# Patient Record
Sex: FEMALE | Race: WHITE
[De-identification: names, ages, dates, MRNs, and addresses within clinical notes are randomized per-mention and may not be internally consistent; named-entity substitution may affect disease eponyms.]

---

## 2018-02-14 ENCOUNTER — HOSPITAL ENCOUNTER (EMERGENCY)
Age: 38
LOS: 1 days | Discharge: HOME | End: 2018-02-15
Payer: MEDICAID

## 2018-02-14 VITALS
DIASTOLIC BLOOD PRESSURE: 80 MMHG | HEART RATE: 110 BPM | TEMPERATURE: 97.88 F | RESPIRATION RATE: 17 BRPM | OXYGEN SATURATION: 97 % | SYSTOLIC BLOOD PRESSURE: 129 MMHG

## 2018-02-14 VITALS — BODY MASS INDEX: 30.4 KG/M2

## 2018-02-14 DIAGNOSIS — Z86.14: ICD-10-CM

## 2018-02-14 DIAGNOSIS — J40: ICD-10-CM

## 2018-02-14 DIAGNOSIS — L02.01: Primary | ICD-10-CM

## 2018-02-14 DIAGNOSIS — F17.200: ICD-10-CM

## 2018-02-14 PROCEDURE — 71046 X-RAY EXAM CHEST 2 VIEWS: CPT

## 2018-02-14 PROCEDURE — 99282 EMERGENCY DEPT VISIT SF MDM: CPT

## 2018-02-15 VITALS
HEART RATE: 106 BPM | RESPIRATION RATE: 18 BRPM | DIASTOLIC BLOOD PRESSURE: 91 MMHG | SYSTOLIC BLOOD PRESSURE: 140 MMHG | OXYGEN SATURATION: 99 %

## 2018-02-15 VITALS — OXYGEN SATURATION: 100 %

## 2018-04-12 ENCOUNTER — HOSPITAL ENCOUNTER (OUTPATIENT)
Dept: HOSPITAL 100 - ED | Age: 38
Discharge: HOME | End: 2018-04-12
Payer: SELF-PAY

## 2018-04-12 ENCOUNTER — HOSPITAL ENCOUNTER (EMERGENCY)
Age: 38
Discharge: HOME | End: 2018-04-12
Payer: MEDICAID

## 2018-04-12 VITALS
SYSTOLIC BLOOD PRESSURE: 132 MMHG | RESPIRATION RATE: 16 BRPM | HEART RATE: 76 BPM | DIASTOLIC BLOOD PRESSURE: 107 MMHG | OXYGEN SATURATION: 95 % | TEMPERATURE: 97.88 F

## 2018-04-12 VITALS — BODY MASS INDEX: 29.5 KG/M2

## 2018-04-12 DIAGNOSIS — L90.5: ICD-10-CM

## 2018-04-12 DIAGNOSIS — M25.511: Primary | ICD-10-CM

## 2018-04-12 DIAGNOSIS — Z04.41: Primary | ICD-10-CM

## 2018-04-12 DIAGNOSIS — M25.522: ICD-10-CM

## 2018-04-12 PROCEDURE — 73080 X-RAY EXAM OF ELBOW: CPT

## 2018-04-12 PROCEDURE — 73060 X-RAY EXAM OF HUMERUS: CPT

## 2018-04-12 PROCEDURE — 99282 EMERGENCY DEPT VISIT SF MDM: CPT

## 2018-04-17 ENCOUNTER — HOSPITAL ENCOUNTER (OUTPATIENT)
Age: 38
End: 2018-04-17
Payer: MEDICAID

## 2018-04-17 DIAGNOSIS — Z11.3: Primary | ICD-10-CM

## 2018-04-17 LAB — SAMPLE ADEQUACY CONTROL: (no result)

## 2018-04-17 PROCEDURE — 86803 HEPATITIS C AB TEST: CPT

## 2018-04-17 PROCEDURE — 87340 HEPATITIS B SURFACE AG IA: CPT

## 2018-04-17 PROCEDURE — 86703 HIV-1/HIV-2 1 RESULT ANTBDY: CPT

## 2018-04-17 PROCEDURE — 87591 N.GONORRHOEAE DNA AMP PROB: CPT

## 2018-04-17 PROCEDURE — 87491 CHLMYD TRACH DNA AMP PROBE: CPT

## 2018-04-17 PROCEDURE — 86592 SYPHILIS TEST NON-TREP QUAL: CPT

## 2018-04-17 PROCEDURE — 36415 COLL VENOUS BLD VENIPUNCTURE: CPT

## 2018-04-19 LAB — HEP C ANTIBODIES: <0.1 S/CO RATIO (ref 0–0.9)

## 2018-04-20 LAB — RAPID PLASMIN REAGIN (RPR): NONREACTIVE

## 2018-04-28 ENCOUNTER — HOSPITAL ENCOUNTER (EMERGENCY)
Age: 38
Discharge: HOME | End: 2018-04-28
Payer: MEDICAID

## 2018-04-28 VITALS
DIASTOLIC BLOOD PRESSURE: 97 MMHG | OXYGEN SATURATION: 99 % | HEART RATE: 70 BPM | RESPIRATION RATE: 16 BRPM | SYSTOLIC BLOOD PRESSURE: 132 MMHG

## 2018-04-28 VITALS
SYSTOLIC BLOOD PRESSURE: 138 MMHG | TEMPERATURE: 95.54 F | OXYGEN SATURATION: 99 % | DIASTOLIC BLOOD PRESSURE: 91 MMHG | RESPIRATION RATE: 16 BRPM | HEART RATE: 102 BPM

## 2018-04-28 VITALS — BODY MASS INDEX: 30.7 KG/M2

## 2018-04-28 DIAGNOSIS — E66.9: ICD-10-CM

## 2018-04-28 DIAGNOSIS — F17.210: ICD-10-CM

## 2018-04-28 DIAGNOSIS — F15.93: ICD-10-CM

## 2018-04-28 DIAGNOSIS — F32.9: ICD-10-CM

## 2018-04-28 DIAGNOSIS — F19.11: ICD-10-CM

## 2018-04-28 DIAGNOSIS — Z79.899: ICD-10-CM

## 2018-04-28 DIAGNOSIS — F41.9: Primary | ICD-10-CM

## 2018-04-28 PROCEDURE — 99282 EMERGENCY DEPT VISIT SF MDM: CPT

## 2018-04-29 ENCOUNTER — HOSPITAL ENCOUNTER (EMERGENCY)
Age: 38
Discharge: HOME | End: 2018-04-29
Payer: MEDICAID

## 2018-04-29 VITALS
HEART RATE: 110 BPM | DIASTOLIC BLOOD PRESSURE: 91 MMHG | TEMPERATURE: 98.1 F | SYSTOLIC BLOOD PRESSURE: 145 MMHG | RESPIRATION RATE: 15 BRPM

## 2018-04-29 VITALS — OXYGEN SATURATION: 98 % | RESPIRATION RATE: 16 BRPM | HEART RATE: 86 BPM

## 2018-04-29 VITALS — BODY MASS INDEX: 30.2 KG/M2

## 2018-04-29 DIAGNOSIS — F41.9: ICD-10-CM

## 2018-04-29 DIAGNOSIS — F32.9: ICD-10-CM

## 2018-04-29 DIAGNOSIS — Z86.14: ICD-10-CM

## 2018-04-29 DIAGNOSIS — F15.90: Primary | ICD-10-CM

## 2018-04-29 DIAGNOSIS — Z79.899: ICD-10-CM

## 2018-04-29 PROCEDURE — 99282 EMERGENCY DEPT VISIT SF MDM: CPT

## 2018-05-01 ENCOUNTER — HOSPITAL ENCOUNTER (OUTPATIENT)
Dept: HOSPITAL 100 - EDREF | Age: 38
LOS: 1 days | Discharge: HOME | End: 2018-05-02
Payer: SELF-PAY

## 2018-05-01 DIAGNOSIS — Z04.41: Primary | ICD-10-CM

## 2018-05-02 ENCOUNTER — HOSPITAL ENCOUNTER (EMERGENCY)
Age: 38
Discharge: HOME | End: 2018-05-02
Payer: MEDICAID

## 2018-05-02 VITALS — BODY MASS INDEX: 24.3 KG/M2

## 2018-05-02 VITALS
DIASTOLIC BLOOD PRESSURE: 78 MMHG | HEART RATE: 76 BPM | RESPIRATION RATE: 18 BRPM | TEMPERATURE: 98.3 F | OXYGEN SATURATION: 97 % | SYSTOLIC BLOOD PRESSURE: 122 MMHG

## 2018-05-02 VITALS — RESPIRATION RATE: 16 BRPM
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
## 2018-05-02 DIAGNOSIS — F15.20: ICD-10-CM
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
## 2018-05-02 DIAGNOSIS — Z04.41: Primary | ICD-10-CM

## 2018-05-02 DIAGNOSIS — T14.8XXA: ICD-10-CM
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
## 2018-05-02 LAB
ALANINE AMINOTRANSFER ALT/SGPT: 19 U/L (ref 13–56)
ALBUMIN SERPL-MCNC: 3.8 G/DL (ref 3.2–5)
ALCOHOL, BLOOD (MEDICAL)-SERUM: < 3 MG/DL
ALKALINE PHOSPHATASE: 84 U/L (ref 45–117)
AMPHET UR-MCNC: POSITIVE NG/ML
ANION GAP: 8 (ref 5–15)
AST(SGOT): 9 U/L (ref 15–37)
B-HCG SERPL QL: NEGATIVE NEGATIVE
BARBITURATE URINE VISTA: NEGATIVE
BENZODIAZEPINE URINE VISTA: POSITIVE
BUN SERPL-MCNC: 11 MG/DL (ref 7–18)
BUN/CREAT RATIO: 13.7 RATIO (ref 10–20)
CALCIUM SERPL-MCNC: 8.9 MG/DL (ref 8.5–10.1)
CARBON DIOXIDE: 28 MMOL/L (ref 21–32)
CHLORIDE: 104 MMOL/L (ref 98–107)
COCAINE URINE VISTA: NEGATIVE
DEPRECATED RDW RBC: 38.7 FL (ref 35.1–43.9)
DIFFERENTIAL INDICATED: (no result)
DRUG CONFIRMATION TO FOLLOW?: (no result)
ECSTACY URINE VISTA: POSITIVE
ERYTHROCYTE [DISTWIDTH] IN BLOOD: 11.8 % (ref 11.6–14.6)
EST GLOM FILT RATE - AFR AMER: 103 ML/MIN (ref 60–?)
ESTIMATED CREATININE CLEARANCE: 92.72 ML/MIN
GLOBULIN: 3.7 G/DL (ref 2.2–4.2)
GLUCOSE: 93 MG/DL (ref 74–106)
HCG SERPL QL: NEGATIVE NEGATIVE
HCT VFR BLD AUTO: 40.2 % (ref 37–47)
HEMOGLOBIN: 13.8 G/DL (ref 12–15)
HGB BLD-MCNC: 13.8 G/DL (ref 12–15)
IMMATURE GRANULOCYTES COUNT: 0 X10^3/UL (ref 0–0)
MCV RBC: 90.3 FL (ref 81–99)
MEAN CORP HGB CONC: 34.3 G/GL (ref 32–36)
MEAN PLATELET VOL.: 8.8 FL (ref 6.2–12)
METHADONE URINE VISTA: NEGATIVE
PCP UR QL: NEGATIVE
PH UR: 5 [PH]
PLATELET # BLD: 268 K/MM3 (ref 150–450)
PLATELET COUNT: 268 K/MM3 (ref 150–450)
POSITIVE COUNT: NO
POSITIVE DIFFERENTIAL: NO
POSITIVE MORPHOLOGY: NO
POTASSIUM: 3.3 MMOL/L (ref 3.5–5.1)
RBC # BLD AUTO: 4.45 M/MM3 (ref 4.2–5.4)
RBC DISTRIBUTION WIDTH CV: 11.8 % (ref 11.6–14.6)
RBC DISTRIBUTION WIDTH SD: 38.7 FL (ref 35.1–43.9)
THC URINE VISTA: POSITIVE
WBC # BLD AUTO: 5.1 K/MM3 (ref 4.4–11)
WHITE BLOOD COUNT: 5.1 K/MM3 (ref 4.4–11)

## 2018-05-02 PROCEDURE — 80307 DRUG TEST PRSMV CHEM ANLYZR: CPT

## 2018-05-02 PROCEDURE — 85025 COMPLETE CBC W/AUTO DIFF WBC: CPT

## 2018-05-02 PROCEDURE — G0480 DRUG TEST DEF 1-7 CLASSES: HCPCS

## 2018-05-02 PROCEDURE — 84703 CHORIONIC GONADOTROPIN ASSAY: CPT

## 2018-05-02 PROCEDURE — 80053 COMPREHEN METABOLIC PANEL: CPT

## 2018-05-02 PROCEDURE — 99283 EMERGENCY DEPT VISIT LOW MDM: CPT

## 2018-05-02 PROCEDURE — 80320 DRUG SCREEN QUANTALCOHOLS: CPT

## 2018-06-16 ENCOUNTER — HOSPITAL ENCOUNTER (EMERGENCY)
Dept: HOSPITAL 100 - ED | Age: 38
Discharge: HOME | End: 2018-06-16
Payer: MEDICAID

## 2018-06-16 VITALS — BODY MASS INDEX: 29.3 KG/M2

## 2018-06-16 VITALS
TEMPERATURE: 98.4 F | DIASTOLIC BLOOD PRESSURE: 84 MMHG | RESPIRATION RATE: 18 BRPM | OXYGEN SATURATION: 97 % | HEART RATE: 94 BPM | SYSTOLIC BLOOD PRESSURE: 151 MMHG

## 2018-06-16 DIAGNOSIS — Z86.14: ICD-10-CM

## 2018-06-16 DIAGNOSIS — Z79.899: ICD-10-CM

## 2018-06-16 DIAGNOSIS — Z72.0: ICD-10-CM

## 2018-06-16 DIAGNOSIS — K08.89: Primary | ICD-10-CM

## 2018-06-16 DIAGNOSIS — Z98.818: ICD-10-CM

## 2018-06-16 PROCEDURE — 99282 EMERGENCY DEPT VISIT SF MDM: CPT

## 2018-07-09 ENCOUNTER — HOSPITAL ENCOUNTER (EMERGENCY)
Dept: HOSPITAL 100 - ED | Age: 38
Discharge: HOME | End: 2018-07-09
Payer: MEDICAID

## 2018-07-09 VITALS
RESPIRATION RATE: 18 BRPM | OXYGEN SATURATION: 100 % | BODY MASS INDEX: 28.7 KG/M2 | SYSTOLIC BLOOD PRESSURE: 145 MMHG | DIASTOLIC BLOOD PRESSURE: 81 MMHG | HEART RATE: 100 BPM | TEMPERATURE: 97.9 F

## 2018-07-09 VITALS — RESPIRATION RATE: 18 BRPM
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
## 2018-07-09 DIAGNOSIS — Z72.0: ICD-10-CM
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
## 2018-07-09 DIAGNOSIS — S90.122A: Primary | ICD-10-CM

## 2018-07-09 PROCEDURE — 73660 X-RAY EXAM OF TOE(S): CPT

## 2018-07-09 PROCEDURE — 99283 EMERGENCY DEPT VISIT LOW MDM: CPT

## 2018-07-26 ENCOUNTER — HOSPITAL ENCOUNTER (EMERGENCY)
Age: 38
Discharge: HOME | End: 2018-07-26
Payer: MEDICAID

## 2018-07-26 VITALS — RESPIRATION RATE: 20 BRPM | HEART RATE: 104 BPM

## 2018-07-26 VITALS
RESPIRATION RATE: 32 BRPM | SYSTOLIC BLOOD PRESSURE: 122 MMHG | OXYGEN SATURATION: 97 % | HEART RATE: 97 BPM | DIASTOLIC BLOOD PRESSURE: 81 MMHG | TEMPERATURE: 98.78 F

## 2018-07-26 VITALS
DIASTOLIC BLOOD PRESSURE: 90 MMHG | SYSTOLIC BLOOD PRESSURE: 118 MMHG | RESPIRATION RATE: 26 BRPM | HEART RATE: 95 BPM | OXYGEN SATURATION: 100 %

## 2018-07-26 VITALS — OXYGEN SATURATION: 100 %

## 2018-07-26 VITALS — BODY MASS INDEX: 28.7 KG/M2
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
## 2018-07-26 DIAGNOSIS — Z86.14: ICD-10-CM

## 2018-07-26 DIAGNOSIS — S90.822A: ICD-10-CM

## 2018-07-26 DIAGNOSIS — Z72.0: ICD-10-CM

## 2018-07-26 DIAGNOSIS — S90.821A: ICD-10-CM

## 2018-07-26 DIAGNOSIS — R07.9: ICD-10-CM
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
## 2018-07-26 DIAGNOSIS — J42: Primary | ICD-10-CM

## 2018-07-26 LAB
ANION GAP: 6 (ref 5–15)
BUN SERPL-MCNC: 14 MG/DL (ref 7–18)
BUN/CREAT RATIO: 16.9 RATIO (ref 10–20)
CALCIUM SERPL-MCNC: 8.3 MG/DL (ref 8.5–10.1)
CARBON DIOXIDE: 28 MMOL/L (ref 21–32)
CHLORIDE: 109 MMOL/L (ref 98–107)
DEPRECATED RDW RBC: 41.4 FL (ref 35.1–43.9)
DIFFERENTIAL INDICATED: (no result)
ERYTHROCYTE [DISTWIDTH] IN BLOOD: 12.3 % (ref 11.6–14.6)
EST GLOM FILT RATE - AFR AMER: 99 ML/MIN (ref 60–?)
ESTIMATED CREATININE CLEARANCE: 85.19 ML/MIN
GLUCOSE: 78 MG/DL (ref 74–106)
HCT VFR BLD AUTO: 39 % (ref 37–47)
HEMOGLOBIN: 13 G/DL (ref 12–15)
HGB BLD-MCNC: 13 G/DL (ref 12–15)
IMMATURE GRANULOCYTES COUNT: 0 X10^3/UL (ref 0–0)
MCV RBC: 92.2 FL (ref 81–99)
MEAN CORP HGB CONC: 33.3 G/GL (ref 32–36)
MEAN PLATELET VOL.: 9.3 FL (ref 6.2–12)
PLATELET # BLD: 312 K/MM3 (ref 150–450)
PLATELET COUNT: 312 K/MM3 (ref 150–450)
POSITIVE COUNT: NO
POSITIVE DIFFERENTIAL: NO
POSITIVE MORPHOLOGY: NO
POTASSIUM: 3.6 MMOL/L (ref 3.5–5.1)
RBC # BLD AUTO: 4.23 M/MM3 (ref 4.2–5.4)
RBC DISTRIBUTION WIDTH CV: 12.3 % (ref 11.6–14.6)
RBC DISTRIBUTION WIDTH SD: 41.4 FL (ref 35.1–43.9)
WBC # BLD AUTO: 5.4 K/MM3 (ref 4.4–11)
WHITE BLOOD COUNT: 5.4 K/MM3 (ref 4.4–11)

## 2018-07-26 PROCEDURE — 84484 ASSAY OF TROPONIN QUANT: CPT

## 2018-07-26 PROCEDURE — 94640 AIRWAY INHALATION TREATMENT: CPT

## 2018-07-26 PROCEDURE — 93005 ELECTROCARDIOGRAM TRACING: CPT

## 2018-07-26 PROCEDURE — 85025 COMPLETE CBC W/AUTO DIFF WBC: CPT

## 2018-07-26 PROCEDURE — A4216 STERILE WATER/SALINE, 10 ML: HCPCS

## 2018-07-26 PROCEDURE — 71045 X-RAY EXAM CHEST 1 VIEW: CPT

## 2018-07-26 PROCEDURE — 99285 EMERGENCY DEPT VISIT HI MDM: CPT

## 2018-07-26 PROCEDURE — 80048 BASIC METABOLIC PNL TOTAL CA: CPT

## 2018-07-29 ENCOUNTER — HOSPITAL ENCOUNTER (EMERGENCY)
Age: 38
Discharge: HOME | End: 2018-07-29
Payer: MEDICAID

## 2018-07-29 VITALS
OXYGEN SATURATION: 97 % | HEART RATE: 116 BPM | TEMPERATURE: 96.98 F | RESPIRATION RATE: 18 BRPM | DIASTOLIC BLOOD PRESSURE: 74 MMHG | SYSTOLIC BLOOD PRESSURE: 128 MMHG

## 2018-07-29 VITALS — BODY MASS INDEX: 32.1 KG/M2

## 2018-07-29 DIAGNOSIS — Z72.0: ICD-10-CM

## 2018-07-29 DIAGNOSIS — F15.11: ICD-10-CM

## 2018-07-29 DIAGNOSIS — F12.11: ICD-10-CM
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
## 2018-07-29 DIAGNOSIS — Z59.0: ICD-10-CM

## 2018-07-29 DIAGNOSIS — S90.822A: Primary | ICD-10-CM

## 2018-07-29 DIAGNOSIS — Z76.5: ICD-10-CM

## 2018-07-29 DIAGNOSIS — S90.821A: ICD-10-CM
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
## 2018-07-29 PROCEDURE — 99282 EMERGENCY DEPT VISIT SF MDM: CPT

## 2018-07-29 SDOH — ECONOMIC STABILITY - HOUSING INSECURITY: HOMELESSNESS: Z59.0

## 2020-04-16 ENCOUNTER — HOSPITAL ENCOUNTER (EMERGENCY)
Age: 40
Discharge: HOME | End: 2020-04-16
Payer: MEDICAID

## 2020-04-16 VITALS
DIASTOLIC BLOOD PRESSURE: 106 MMHG | RESPIRATION RATE: 18 BRPM | HEART RATE: 88 BPM | OXYGEN SATURATION: 98 % | SYSTOLIC BLOOD PRESSURE: 142 MMHG | TEMPERATURE: 99.32 F

## 2020-04-16 VITALS
SYSTOLIC BLOOD PRESSURE: 142 MMHG | RESPIRATION RATE: 18 BRPM | BODY MASS INDEX: 34.3 KG/M2 | HEART RATE: 88 BPM | OXYGEN SATURATION: 98 % | TEMPERATURE: 99.32 F | DIASTOLIC BLOOD PRESSURE: 106 MMHG

## 2020-04-16 DIAGNOSIS — Z90.710: ICD-10-CM

## 2020-04-16 DIAGNOSIS — F17.200: ICD-10-CM

## 2020-04-16 DIAGNOSIS — N76.0: Primary | ICD-10-CM

## 2020-04-16 PROCEDURE — 99284 EMERGENCY DEPT VISIT MOD MDM: CPT

## 2020-04-16 PROCEDURE — 87210 SMEAR WET MOUNT SALINE/INK: CPT

## 2020-04-16 PROCEDURE — 96372 THER/PROPH/DIAG INJ SC/IM: CPT

## 2020-07-01 ENCOUNTER — HOSPITAL ENCOUNTER (EMERGENCY)
Age: 40
Discharge: HOME | End: 2020-07-01
Payer: MEDICAID

## 2020-07-01 VITALS
RESPIRATION RATE: 16 BRPM | HEART RATE: 90 BPM | SYSTOLIC BLOOD PRESSURE: 145 MMHG | OXYGEN SATURATION: 98 % | DIASTOLIC BLOOD PRESSURE: 115 MMHG | TEMPERATURE: 98.42 F

## 2020-07-01 VITALS
DIASTOLIC BLOOD PRESSURE: 85 MMHG | SYSTOLIC BLOOD PRESSURE: 127 MMHG | HEART RATE: 76 BPM | RESPIRATION RATE: 15 BRPM | OXYGEN SATURATION: 99 %

## 2020-07-01 VITALS — BODY MASS INDEX: 34.3 KG/M2 | BODY MASS INDEX: 38.7 KG/M2
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
## 2020-07-01 DIAGNOSIS — S42.251A: Primary | ICD-10-CM

## 2020-07-01 DIAGNOSIS — E66.9: ICD-10-CM
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
## 2020-07-01 DIAGNOSIS — M54.2: ICD-10-CM
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
## 2020-07-01 DIAGNOSIS — Z72.0: ICD-10-CM

## 2020-07-01 PROCEDURE — 99285 EMERGENCY DEPT VISIT HI MDM: CPT

## 2020-07-01 PROCEDURE — 73060 X-RAY EXAM OF HUMERUS: CPT

## 2020-07-01 PROCEDURE — 73030 X-RAY EXAM OF SHOULDER: CPT

## 2020-08-12 ENCOUNTER — HOSPITAL ENCOUNTER (EMERGENCY)
Age: 40
Discharge: ANOTHER HOSPITAL | End: 2020-08-12
Payer: MEDICAID

## 2020-08-12 VITALS
HEART RATE: 113 BPM | OXYGEN SATURATION: 96 % | DIASTOLIC BLOOD PRESSURE: 84 MMHG | SYSTOLIC BLOOD PRESSURE: 118 MMHG | RESPIRATION RATE: 16 BRPM

## 2020-08-12 VITALS
TEMPERATURE: 98.2 F | DIASTOLIC BLOOD PRESSURE: 86 MMHG | SYSTOLIC BLOOD PRESSURE: 124 MMHG | OXYGEN SATURATION: 92 % | RESPIRATION RATE: 21 BRPM | HEART RATE: 104 BPM

## 2020-08-12 VITALS — BODY MASS INDEX: 38.7 KG/M2 | BODY MASS INDEX: 43.4 KG/M2

## 2020-08-12 VITALS — HEART RATE: 104 BPM | DIASTOLIC BLOOD PRESSURE: 104 MMHG | SYSTOLIC BLOOD PRESSURE: 123 MMHG

## 2020-08-12 VITALS
OXYGEN SATURATION: 94 % | RESPIRATION RATE: 16 BRPM | HEART RATE: 100 BPM | DIASTOLIC BLOOD PRESSURE: 92 MMHG | SYSTOLIC BLOOD PRESSURE: 124 MMHG
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
## 2020-08-12 DIAGNOSIS — Z87.891: ICD-10-CM

## 2020-08-12 DIAGNOSIS — S32.441A: Primary | ICD-10-CM
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
## 2020-08-12 DIAGNOSIS — S32.049A: ICD-10-CM

## 2020-08-12 DIAGNOSIS — S06.9X9A: ICD-10-CM

## 2020-08-12 DIAGNOSIS — E04.1: ICD-10-CM

## 2020-08-12 DIAGNOSIS — N28.9: ICD-10-CM
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
## 2020-08-12 DIAGNOSIS — S40.812A: ICD-10-CM

## 2020-08-12 DIAGNOSIS — S30.810A: ICD-10-CM
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
## 2020-08-12 LAB
ALANINE AMINOTRANSFER ALT/SGPT: 51 U/L (ref 13–56)
ALBUMIN SERPL-MCNC: 3.7 G/DL (ref 3.2–5)
ALKALINE PHOSPHATASE: 117 U/L (ref 45–117)
ANION GAP: 6 (ref 5–15)
AST(SGOT): 27 U/L (ref 15–37)
B-HCG SERPL QL: NEGATIVE NEGATIVE
BILIRUB DIRECT SERPL-MCNC: 0.12 MG/DL (ref 0–0.3)
BUN SERPL-MCNC: 20 MG/DL (ref 7–18)
BUN/CREAT RATIO: 20.5 RATIO (ref 10–20)
CALCIUM SERPL-MCNC: 9.2 MG/DL (ref 8.5–10.1)
CARBON DIOXIDE: 29 MMOL/L (ref 21–32)
CHLORIDE: 108 MMOL/L (ref 98–107)
DEPRECATED RDW RBC: 39.3 FL (ref 35.1–43.9)
ERYTHROCYTE [DISTWIDTH] IN BLOOD: 11.8 % (ref 11.6–14.6)
EST GLOM FILT RATE - AFR AMER: 81 ML/MIN (ref 60–?)
ESTIMATED CREATININE CLEARANCE: 71.45 ML/MIN
GLOBULIN: 4.5 G/DL (ref 2.2–4.2)
GLUCOSE: 89 MG/DL (ref 74–106)
HCG SERPL QL: NEGATIVE NEGATIVE
HCT VFR BLD AUTO: 42.4 % (ref 37–47)
HEMOGLOBIN: 14.7 G/DL (ref 12–15)
HGB BLD-MCNC: 14.7 G/DL (ref 12–15)
IMMATURE GRANULOCYTES COUNT: 0.08 X10^3/UL (ref 0–0)
INTERNAL QC VALIDATED?: (no result)
LIPASE: 105 U/L (ref 73–393)
MCV RBC: 91.8 FL (ref 81–99)
MEAN CORP HGB CONC: 34.7 G/DL (ref 32–36)
MEAN PLATELET VOL.: 9.5 FL (ref 6.2–12)
NRBC FLAGGED BY ANALYZER: 0 % (ref 0–5)
PLATELET # BLD: 334 K/MM3 (ref 150–450)
PLATELET COUNT: 334 K/MM3 (ref 150–450)
POTASSIUM: 3.6 MMOL/L (ref 3.5–5.1)
RBC # BLD AUTO: 4.62 M/MM3 (ref 4.2–5.4)
RBC DISTRIBUTION WIDTH CV: 11.8 % (ref 11.6–14.6)
RBC DISTRIBUTION WIDTH SD: 39.3 FL (ref 35.1–43.9)
WBC # BLD AUTO: 8.4 K/MM3 (ref 4.4–11)
WHITE BLOOD COUNT: 8.4 K/MM3 (ref 4.4–11)

## 2020-08-12 PROCEDURE — 84703 CHORIONIC GONADOTROPIN ASSAY: CPT

## 2020-08-12 PROCEDURE — 99285 EMERGENCY DEPT VISIT HI MDM: CPT

## 2020-08-12 PROCEDURE — 80048 BASIC METABOLIC PNL TOTAL CA: CPT

## 2020-08-12 PROCEDURE — 71260 CT THORAX DX C+: CPT

## 2020-08-12 PROCEDURE — 93005 ELECTROCARDIOGRAM TRACING: CPT

## 2020-08-12 PROCEDURE — 72125 CT NECK SPINE W/O DYE: CPT

## 2020-08-12 PROCEDURE — 96361 HYDRATE IV INFUSION ADD-ON: CPT

## 2020-08-12 PROCEDURE — 86901 BLOOD TYPING SEROLOGIC RH(D): CPT

## 2020-08-12 PROCEDURE — 96374 THER/PROPH/DIAG INJ IV PUSH: CPT

## 2020-08-12 PROCEDURE — 85025 COMPLETE CBC W/AUTO DIFF WBC: CPT

## 2020-08-12 PROCEDURE — A4216 STERILE WATER/SALINE, 10 ML: HCPCS

## 2020-08-12 PROCEDURE — 83690 ASSAY OF LIPASE: CPT

## 2020-08-12 PROCEDURE — 86900 BLOOD TYPING SEROLOGIC ABO: CPT

## 2020-08-12 PROCEDURE — 80076 HEPATIC FUNCTION PANEL: CPT

## 2020-08-12 PROCEDURE — 84484 ASSAY OF TROPONIN QUANT: CPT

## 2020-08-12 PROCEDURE — 70450 CT HEAD/BRAIN W/O DYE: CPT

## 2020-08-12 PROCEDURE — 96375 TX/PRO/DX INJ NEW DRUG ADDON: CPT

## 2020-08-12 PROCEDURE — 74177 CT ABD & PELVIS W/CONTRAST: CPT

## 2020-08-12 PROCEDURE — 86850 RBC ANTIBODY SCREEN: CPT

## 2021-08-10 ENCOUNTER — HOSPITAL ENCOUNTER (EMERGENCY)
Age: 41
Discharge: HOME | End: 2021-08-10
Payer: MEDICAID

## 2021-08-10 VITALS
DIASTOLIC BLOOD PRESSURE: 73 MMHG | SYSTOLIC BLOOD PRESSURE: 121 MMHG | RESPIRATION RATE: 18 BRPM | OXYGEN SATURATION: 97 % | HEART RATE: 78 BPM

## 2021-08-10 VITALS
HEART RATE: 111 BPM | TEMPERATURE: 98.9 F | SYSTOLIC BLOOD PRESSURE: 113 MMHG | OXYGEN SATURATION: 96 % | DIASTOLIC BLOOD PRESSURE: 95 MMHG | RESPIRATION RATE: 28 BRPM

## 2021-08-10 VITALS
SYSTOLIC BLOOD PRESSURE: 117 MMHG | OXYGEN SATURATION: 96 % | RESPIRATION RATE: 24 BRPM | HEART RATE: 103 BPM | DIASTOLIC BLOOD PRESSURE: 84 MMHG

## 2021-08-10 VITALS — BODY MASS INDEX: 35.3 KG/M2 | BODY MASS INDEX: 43.4 KG/M2

## 2021-08-10 DIAGNOSIS — F17.210: ICD-10-CM

## 2021-08-10 DIAGNOSIS — Z79.899: ICD-10-CM

## 2021-08-10 DIAGNOSIS — U07.1: Primary | ICD-10-CM

## 2021-08-10 DIAGNOSIS — F32.9: ICD-10-CM

## 2021-08-10 DIAGNOSIS — F41.9: ICD-10-CM

## 2021-08-10 LAB
ALANINE AMINOTRANSFER ALT/SGPT: 29 U/L (ref 13–56)
ALBUMIN SERPL-MCNC: 3.4 G/DL (ref 3.2–5)
ALKALINE PHOSPHATASE: 112 U/L (ref 45–117)
ANION GAP: 6 (ref 5–15)
AST(SGOT): 19 U/L (ref 15–37)
BUN SERPL-MCNC: 16 MG/DL (ref 7–18)
BUN/CREAT RATIO: 20.6 RATIO (ref 10–20)
CALCIUM SERPL-MCNC: 8 MG/DL (ref 8.5–10.1)
CARBON DIOXIDE: 26 MMOL/L (ref 21–32)
CHLORIDE: 107 MMOL/L (ref 98–107)
DEPRECATED RDW RBC: 40.8 FL (ref 35.1–43.9)
ERYTHROCYTE [DISTWIDTH] IN BLOOD: 12.2 % (ref 11.6–14.6)
EST GLOM FILT RATE - AFR AMER: 104 ML/MIN (ref 60–?)
ESTIMATED CREATININE CLEARANCE: 87.96 ML/MIN
GLOBULIN: 4.2 G/DL (ref 2.2–4.2)
GLUCOSE: 109 MG/DL (ref 74–106)
HCT VFR BLD AUTO: 42.3 % (ref 37–47)
HEMOGLOBIN: 14.3 G/DL (ref 12–15)
HGB BLD-MCNC: 14.3 G/DL (ref 12–15)
IMMATURE GRANULOCYTES COUNT: 0.01 X10^3/UL (ref 0–0)
MCV RBC: 91.4 FL (ref 81–99)
MEAN CORP HGB CONC: 33.8 G/DL (ref 32–36)
MEAN PLATELET VOL.: 9.4 FL (ref 6.2–12)
NRBC FLAGGED BY ANALYZER: 0 % (ref 0–5)
PLATELET # BLD: 254 K/MM3 (ref 150–450)
PLATELET COUNT: 254 K/MM3 (ref 150–450)
POTASSIUM: 3.5 MMOL/L (ref 3.5–5.1)
PROCALCITONIN SERPL-MCNC: 0.04 NG/ML (ref 0–0.09)
RBC # BLD AUTO: 4.63 M/MM3 (ref 4.2–5.4)
RBC DISTRIBUTION WIDTH CV: 12.2 % (ref 11.6–14.6)
RBC DISTRIBUTION WIDTH SD: 40.8 FL (ref 35.1–43.9)
TROPONIN-I HS: 4.6 PG/ML (ref 3–53.7)
WBC # BLD AUTO: 5.4 K/MM3 (ref 4.4–11)
WHITE BLOOD COUNT: 5.4 K/MM3 (ref 4.4–11)

## 2021-08-10 PROCEDURE — 85025 COMPLETE CBC W/AUTO DIFF WBC: CPT

## 2021-08-10 PROCEDURE — 80053 COMPREHEN METABOLIC PANEL: CPT

## 2021-08-10 PROCEDURE — 71275 CT ANGIOGRAPHY CHEST: CPT

## 2021-08-10 PROCEDURE — 71045 X-RAY EXAM CHEST 1 VIEW: CPT

## 2021-08-10 PROCEDURE — 84145 PROCALCITONIN (PCT): CPT

## 2021-08-10 PROCEDURE — 99285 EMERGENCY DEPT VISIT HI MDM: CPT

## 2021-08-10 PROCEDURE — 93005 ELECTROCARDIOGRAM TRACING: CPT

## 2021-08-10 PROCEDURE — 84484 ASSAY OF TROPONIN QUANT: CPT

## 2021-08-24 ENCOUNTER — HOSPITAL ENCOUNTER (EMERGENCY)
Age: 41
Discharge: HOME | End: 2021-08-24
Payer: MEDICAID

## 2021-08-24 VITALS
OXYGEN SATURATION: 96 % | RESPIRATION RATE: 18 BRPM | DIASTOLIC BLOOD PRESSURE: 87 MMHG | HEART RATE: 98 BPM | SYSTOLIC BLOOD PRESSURE: 139 MMHG

## 2021-08-24 VITALS
DIASTOLIC BLOOD PRESSURE: 92 MMHG | OXYGEN SATURATION: 96 % | TEMPERATURE: 97.34 F | RESPIRATION RATE: 17 BRPM | SYSTOLIC BLOOD PRESSURE: 117 MMHG | HEART RATE: 91 BPM

## 2021-08-24 VITALS
HEART RATE: 63 BPM | DIASTOLIC BLOOD PRESSURE: 92 MMHG | SYSTOLIC BLOOD PRESSURE: 117 MMHG | OXYGEN SATURATION: 97 % | TEMPERATURE: 97.34 F | RESPIRATION RATE: 18 BRPM

## 2021-08-24 VITALS
HEART RATE: 90 BPM | DIASTOLIC BLOOD PRESSURE: 89 MMHG | OXYGEN SATURATION: 100 % | RESPIRATION RATE: 18 BRPM | SYSTOLIC BLOOD PRESSURE: 134 MMHG

## 2021-08-24 VITALS — BODY MASS INDEX: 29.2 KG/M2

## 2021-08-24 DIAGNOSIS — Z79.01: ICD-10-CM

## 2021-08-24 DIAGNOSIS — L08.9: ICD-10-CM

## 2021-08-24 DIAGNOSIS — F41.9: ICD-10-CM

## 2021-08-24 DIAGNOSIS — R53.83: ICD-10-CM

## 2021-08-24 DIAGNOSIS — F32.9: ICD-10-CM

## 2021-08-24 DIAGNOSIS — Z79.899: ICD-10-CM

## 2021-08-24 DIAGNOSIS — R19.7: ICD-10-CM

## 2021-08-24 DIAGNOSIS — R04.2: ICD-10-CM

## 2021-08-24 DIAGNOSIS — I26.99: Primary | ICD-10-CM

## 2021-08-24 DIAGNOSIS — F17.210: ICD-10-CM

## 2021-08-24 DIAGNOSIS — E66.9: ICD-10-CM

## 2021-08-24 DIAGNOSIS — Z86.16: ICD-10-CM

## 2021-08-24 LAB
ALANINE AMINOTRANSFER ALT/SGPT: 28 U/L (ref 13–56)
ALBUMIN SERPL-MCNC: 3.6 G/DL (ref 3.2–5)
ALKALINE PHOSPHATASE: 121 U/L (ref 45–117)
ANION GAP: 4 (ref 5–15)
AST(SGOT): 14 U/L (ref 15–37)
BUN SERPL-MCNC: 10 MG/DL (ref 7–18)
BUN/CREAT RATIO: 18.9 RATIO (ref 10–20)
CALCIUM SERPL-MCNC: 9.2 MG/DL (ref 8.5–10.1)
CARBON DIOXIDE: 31 MMOL/L (ref 21–32)
CHLORIDE: 102 MMOL/L (ref 98–107)
DEPRECATED RDW RBC: 39.5 FL (ref 35.1–43.9)
ERYTHROCYTE [DISTWIDTH] IN BLOOD: 12.1 % (ref 11.6–14.6)
EST GLOM FILT RATE - AFR AMER: 163 ML/MIN (ref 60–?)
ESTIMATED CREATININE CLEARANCE: 134.47 ML/MIN
GLOBULIN: 4.6 G/DL (ref 2.2–4.2)
GLUCOSE: 91 MG/DL (ref 74–106)
HCT VFR BLD AUTO: 40 % (ref 37–47)
HEMOGLOBIN: 13.6 G/DL (ref 12–15)
HGB BLD-MCNC: 13.6 G/DL (ref 12–15)
IMMATURE GRANULOCYTES COUNT: 0.01 X10^3/UL (ref 0–0)
MCV RBC: 90.3 FL (ref 81–99)
MEAN CORP HGB CONC: 34 G/DL (ref 32–36)
MEAN PLATELET VOL.: 8.8 FL (ref 6.2–12)
NRBC FLAGGED BY ANALYZER: 0 % (ref 0–5)
PLATELET # BLD: 399 K/MM3 (ref 150–450)
PLATELET COUNT: 399 K/MM3 (ref 150–450)
POTASSIUM: 3.6 MMOL/L (ref 3.5–5.1)
RBC # BLD AUTO: 4.43 M/MM3 (ref 4.2–5.4)
RBC DISTRIBUTION WIDTH CV: 12.1 % (ref 11.6–14.6)
RBC DISTRIBUTION WIDTH SD: 39.5 FL (ref 35.1–43.9)
TROPONIN-I HS: 5 PG/ML (ref 3–54)
WBC # BLD AUTO: 6.3 K/MM3 (ref 4.4–11)
WHITE BLOOD COUNT: 6.3 K/MM3 (ref 4.4–11)

## 2021-08-24 PROCEDURE — 71275 CT ANGIOGRAPHY CHEST: CPT

## 2021-08-24 PROCEDURE — 84484 ASSAY OF TROPONIN QUANT: CPT

## 2021-08-24 PROCEDURE — 93005 ELECTROCARDIOGRAM TRACING: CPT

## 2021-08-24 PROCEDURE — 80053 COMPREHEN METABOLIC PANEL: CPT

## 2021-08-24 PROCEDURE — A4216 STERILE WATER/SALINE, 10 ML: HCPCS

## 2021-08-24 PROCEDURE — 85025 COMPLETE CBC W/AUTO DIFF WBC: CPT

## 2021-08-24 PROCEDURE — 99285 EMERGENCY DEPT VISIT HI MDM: CPT

## 2021-09-28 ENCOUNTER — HOSPITAL ENCOUNTER (OUTPATIENT)
Age: 41
End: 2021-09-28
Payer: MEDICAID

## 2021-09-28 DIAGNOSIS — R11.0: ICD-10-CM

## 2021-09-28 DIAGNOSIS — R53.81: ICD-10-CM

## 2021-09-28 DIAGNOSIS — R63.0: Primary | ICD-10-CM

## 2021-09-28 LAB
ALANINE AMINOTRANSFER ALT/SGPT: 33 U/L (ref 13–56)
ALBUMIN SERPL-MCNC: 3.4 G/DL (ref 3.2–5)
ALKALINE PHOSPHATASE: 97 U/L (ref 45–117)
AST(SGOT): 17 U/L (ref 15–37)
BILIRUB DIRECT SERPL-MCNC: 0.1 MG/DL (ref 0–0.3)
GLOBULIN: 4.3 G/DL (ref 2.2–4.2)

## 2021-09-28 PROCEDURE — 36415 COLL VENOUS BLD VENIPUNCTURE: CPT

## 2021-09-28 PROCEDURE — 86803 HEPATITIS C AB TEST: CPT

## 2021-09-28 PROCEDURE — 80076 HEPATIC FUNCTION PANEL: CPT

## 2021-09-28 PROCEDURE — 87340 HEPATITIS B SURFACE AG IA: CPT

## 2021-10-09 ENCOUNTER — HOSPITAL ENCOUNTER (EMERGENCY)
Age: 41
Discharge: HOME | End: 2021-10-09
Payer: MEDICAID

## 2021-10-09 VITALS
TEMPERATURE: 98.24 F | HEART RATE: 85 BPM | SYSTOLIC BLOOD PRESSURE: 139 MMHG | RESPIRATION RATE: 22 BRPM | DIASTOLIC BLOOD PRESSURE: 99 MMHG | OXYGEN SATURATION: 99 %

## 2021-10-09 VITALS — SYSTOLIC BLOOD PRESSURE: 123 MMHG | RESPIRATION RATE: 20 BRPM | HEART RATE: 96 BPM | DIASTOLIC BLOOD PRESSURE: 94 MMHG

## 2021-10-09 VITALS
HEART RATE: 97 BPM | OXYGEN SATURATION: 98 % | DIASTOLIC BLOOD PRESSURE: 94 MMHG | SYSTOLIC BLOOD PRESSURE: 123 MMHG | TEMPERATURE: 98.24 F | RESPIRATION RATE: 20 BRPM

## 2021-10-09 VITALS
DIASTOLIC BLOOD PRESSURE: 88 MMHG | HEART RATE: 92 BPM | OXYGEN SATURATION: 98 % | TEMPERATURE: 98.3 F | RESPIRATION RATE: 15 BRPM | SYSTOLIC BLOOD PRESSURE: 120 MMHG

## 2021-10-09 VITALS — OXYGEN SATURATION: 98 %

## 2021-10-09 VITALS
DIASTOLIC BLOOD PRESSURE: 99 MMHG | TEMPERATURE: 97.88 F | RESPIRATION RATE: 24 BRPM | SYSTOLIC BLOOD PRESSURE: 140 MMHG | OXYGEN SATURATION: 99 % | HEART RATE: 92 BPM

## 2021-10-09 VITALS
TEMPERATURE: 97.88 F | DIASTOLIC BLOOD PRESSURE: 91 MMHG | SYSTOLIC BLOOD PRESSURE: 143 MMHG | OXYGEN SATURATION: 99 % | RESPIRATION RATE: 24 BRPM | HEART RATE: 95 BPM

## 2021-10-09 VITALS — BODY MASS INDEX: 37.7 KG/M2

## 2021-10-09 DIAGNOSIS — M79.89: ICD-10-CM

## 2021-10-09 DIAGNOSIS — I26.99: Primary | ICD-10-CM

## 2021-10-09 DIAGNOSIS — F17.210: ICD-10-CM

## 2021-10-09 DIAGNOSIS — R07.9: ICD-10-CM

## 2021-10-09 DIAGNOSIS — Z86.16: ICD-10-CM

## 2021-10-09 DIAGNOSIS — Z79.899: ICD-10-CM

## 2021-10-09 DIAGNOSIS — Z79.01: ICD-10-CM

## 2021-10-09 LAB
ANION GAP: 6 (ref 5–15)
BUN SERPL-MCNC: 19 MG/DL (ref 7–18)
BUN/CREAT RATIO: 26.6 RATIO (ref 10–20)
CALCIUM SERPL-MCNC: 9.2 MG/DL (ref 8.5–10.1)
CARBON DIOXIDE: 30 MMOL/L (ref 21–32)
CHLORIDE: 103 MMOL/L (ref 98–107)
DEPRECATED RDW RBC: 40.8 FL (ref 35.1–43.9)
ERYTHROCYTE [DISTWIDTH] IN BLOOD: 11.9 % (ref 11.6–14.6)
EST GLOM FILT RATE - AFR AMER: 115 ML/MIN (ref 60–?)
ESTIMATED CREATININE CLEARANCE: 95.29 ML/MIN
GLUCOSE: 85 MG/DL (ref 74–106)
HCT VFR BLD AUTO: 42.3 % (ref 37–47)
HEMOGLOBIN: 14 G/DL (ref 12–15)
HGB BLD-MCNC: 14 G/DL (ref 12–15)
IMMATURE GRANULOCYTES COUNT: 0.01 X10^3/UL (ref 0–0)
MCV RBC: 92.6 FL (ref 81–99)
MEAN CORP HGB CONC: 33.1 G/DL (ref 32–36)
MEAN PLATELET VOL.: 9.2 FL (ref 6.2–12)
NRBC FLAGGED BY ANALYZER: 0 % (ref 0–5)
PLATELET # BLD: 342 K/MM3 (ref 150–450)
PLATELET COUNT: 342 K/MM3 (ref 150–450)
POTASSIUM: 3.6 MMOL/L (ref 3.5–5.1)
RBC # BLD AUTO: 4.57 M/MM3 (ref 4.2–5.4)
RBC DISTRIBUTION WIDTH CV: 11.9 % (ref 11.6–14.6)
RBC DISTRIBUTION WIDTH SD: 40.8 FL (ref 35.1–43.9)
TROPONIN-I HS: 5 PG/ML (ref 3–54)
WBC # BLD AUTO: 6 K/MM3 (ref 4.4–11)
WHITE BLOOD COUNT: 6 K/MM3 (ref 4.4–11)

## 2021-10-09 PROCEDURE — 85025 COMPLETE CBC W/AUTO DIFF WBC: CPT

## 2021-10-09 PROCEDURE — 93971 EXTREMITY STUDY: CPT

## 2021-10-09 PROCEDURE — 71045 X-RAY EXAM CHEST 1 VIEW: CPT

## 2021-10-09 PROCEDURE — 93005 ELECTROCARDIOGRAM TRACING: CPT

## 2021-10-09 PROCEDURE — 99285 EMERGENCY DEPT VISIT HI MDM: CPT

## 2021-10-09 PROCEDURE — 80048 BASIC METABOLIC PNL TOTAL CA: CPT

## 2021-10-09 PROCEDURE — 84484 ASSAY OF TROPONIN QUANT: CPT

## 2021-10-09 PROCEDURE — A4216 STERILE WATER/SALINE, 10 ML: HCPCS

## 2021-12-22 ENCOUNTER — HOSPITAL ENCOUNTER (EMERGENCY)
Age: 41
Discharge: HOME | End: 2021-12-22
Payer: MEDICAID

## 2021-12-22 VITALS
RESPIRATION RATE: 14 BRPM | SYSTOLIC BLOOD PRESSURE: 138 MMHG | DIASTOLIC BLOOD PRESSURE: 79 MMHG | OXYGEN SATURATION: 99 % | TEMPERATURE: 98.06 F | HEART RATE: 99 BPM

## 2021-12-22 VITALS — BODY MASS INDEX: 39.4 KG/M2

## 2021-12-22 DIAGNOSIS — Z86.16: ICD-10-CM

## 2021-12-22 DIAGNOSIS — Z79.899: ICD-10-CM

## 2021-12-22 DIAGNOSIS — F32.A: ICD-10-CM

## 2021-12-22 DIAGNOSIS — F17.210: ICD-10-CM

## 2021-12-22 DIAGNOSIS — Z86.711: ICD-10-CM

## 2021-12-22 DIAGNOSIS — Z79.02: ICD-10-CM

## 2021-12-22 DIAGNOSIS — F41.9: ICD-10-CM

## 2021-12-22 DIAGNOSIS — E04.1: ICD-10-CM

## 2021-12-22 DIAGNOSIS — I89.0: Primary | ICD-10-CM

## 2021-12-22 LAB
ALANINE AMINOTRANSFER ALT/SGPT: 52 U/L (ref 13–56)
ALBUMIN SERPL-MCNC: 3.3 G/DL (ref 3.2–5)
ALKALINE PHOSPHATASE: 111 U/L (ref 45–117)
ANION GAP: 5 (ref 5–15)
AST(SGOT): 35 U/L (ref 15–37)
BNP,B-TYPE NATRIURETIC PEPTIDE: 10 PG/ML (ref 0–100)
BUN SERPL-MCNC: 14 MG/DL (ref 7–18)
BUN/CREAT RATIO: 19.7 RATIO (ref 10–20)
CALCIUM SERPL-MCNC: 9 MG/DL (ref 8.5–10.1)
CARBON DIOXIDE: 25 MMOL/L (ref 21–32)
CHLORIDE: 111 MMOL/L (ref 98–107)
DEPRECATED RDW RBC: 39.7 FL (ref 35.1–43.9)
ERYTHROCYTE [DISTWIDTH] IN BLOOD: 12.3 % (ref 11.6–14.6)
EST GLOM FILT RATE - AFR AMER: 116 ML/MIN (ref 60–?)
ESTIMATED CREATININE CLEARANCE: 96.63 ML/MIN
GLOBULIN: 4.2 G/DL (ref 2.2–4.2)
GLUCOSE: 93 MG/DL (ref 74–106)
HCT VFR BLD AUTO: 38.7 % (ref 37–47)
HEMOGLOBIN: 13.7 G/DL (ref 12–15)
HGB BLD-MCNC: 13.7 G/DL (ref 12–15)
IMMATURE GRANULOCYTES COUNT: 0.01 X10^3/UL (ref 0–0)
KETONE-DIPSTICK: 5 MG/DL
LEUKOCYTE ESTERASE UR QL STRIP: 25 /UL
MCV RBC: 87.8 FL (ref 81–99)
MEAN CORP HGB CONC: 35.4 G/DL (ref 32–36)
MEAN PLATELET VOL.: 9.6 FL (ref 6.2–12)
MUCOUS THREADS URNS QL MICRO: (no result) /HPF
NRBC FLAGGED BY ANALYZER: 0 % (ref 0–5)
PLATELET # BLD: 323 K/MM3 (ref 150–450)
PLATELET COUNT: 323 K/MM3 (ref 150–450)
POTASSIUM: 4.5 MMOL/L (ref 3.5–5.1)
PROT UR QL STRIP.AUTO: 15 MG/DL
RBC # BLD AUTO: 4.41 M/MM3 (ref 4.2–5.4)
RBC DISTRIBUTION WIDTH CV: 12.3 % (ref 11.6–14.6)
RBC DISTRIBUTION WIDTH SD: 39.7 FL (ref 35.1–43.9)
RBC UR QL: (no result) /HPF (ref 0–5)
SP GR UR: 1.02 (ref 1–1.03)
SQUAMOUS URNS QL MICRO: (no result) /HPF (ref 5–10)
TROPONIN-I HS: 4 PG/ML (ref 3–54)
URINE PRESERVATIVE: (no result)
WBC # BLD AUTO: 6.5 K/MM3 (ref 4.4–11)
WHITE BLOOD COUNT: 6.5 K/MM3 (ref 4.4–11)

## 2021-12-22 PROCEDURE — 80053 COMPREHEN METABOLIC PANEL: CPT

## 2021-12-22 PROCEDURE — 83880 ASSAY OF NATRIURETIC PEPTIDE: CPT

## 2021-12-22 PROCEDURE — 93005 ELECTROCARDIOGRAM TRACING: CPT

## 2021-12-22 PROCEDURE — 85025 COMPLETE CBC W/AUTO DIFF WBC: CPT

## 2021-12-22 PROCEDURE — 99282 EMERGENCY DEPT VISIT SF MDM: CPT

## 2021-12-22 PROCEDURE — 81001 URINALYSIS AUTO W/SCOPE: CPT

## 2021-12-22 PROCEDURE — 84484 ASSAY OF TROPONIN QUANT: CPT

## 2022-02-14 ENCOUNTER — HOSPITAL ENCOUNTER (EMERGENCY)
Age: 42
LOS: 1 days | Discharge: HOME | End: 2022-02-15
Payer: MEDICAID

## 2022-02-14 VITALS
OXYGEN SATURATION: 96 % | SYSTOLIC BLOOD PRESSURE: 147 MMHG | DIASTOLIC BLOOD PRESSURE: 101 MMHG | TEMPERATURE: 98.1 F | RESPIRATION RATE: 18 BRPM | HEART RATE: 108 BPM

## 2022-02-14 VITALS — OXYGEN SATURATION: 99 % | RESPIRATION RATE: 16 BRPM

## 2022-02-14 VITALS
SYSTOLIC BLOOD PRESSURE: 136 MMHG | DIASTOLIC BLOOD PRESSURE: 94 MMHG | HEART RATE: 96 BPM | OXYGEN SATURATION: 98 % | RESPIRATION RATE: 16 BRPM

## 2022-02-14 VITALS — BODY MASS INDEX: 30 KG/M2

## 2022-02-14 DIAGNOSIS — Z86.711: ICD-10-CM

## 2022-02-14 DIAGNOSIS — R07.9: Primary | ICD-10-CM

## 2022-02-14 DIAGNOSIS — Z86.16: ICD-10-CM

## 2022-02-14 DIAGNOSIS — F17.210: ICD-10-CM

## 2022-02-14 DIAGNOSIS — Z79.01: ICD-10-CM

## 2022-02-14 DIAGNOSIS — F32.A: ICD-10-CM

## 2022-02-14 DIAGNOSIS — F11.23: ICD-10-CM

## 2022-02-14 DIAGNOSIS — Z79.899: ICD-10-CM

## 2022-02-14 DIAGNOSIS — F41.9: ICD-10-CM

## 2022-02-14 LAB
ANION GAP: 5 (ref 5–15)
BUN SERPL-MCNC: 13 MG/DL (ref 7–18)
BUN/CREAT RATIO: 17.8 RATIO (ref 10–20)
CALCIUM SERPL-MCNC: 8.8 MG/DL (ref 8.5–10.1)
CARBON DIOXIDE: 27 MMOL/L (ref 21–32)
CHLORIDE: 108 MMOL/L (ref 98–107)
DEPRECATED RDW RBC: 37 FL (ref 35.1–43.9)
ERYTHROCYTE [DISTWIDTH] IN BLOOD: 12.1 % (ref 11.6–14.6)
EST GLOM FILT RATE - AFR AMER: 112 ML/MIN (ref 60–?)
ESTIMATED CREATININE CLEARANCE: 97.63 ML/MIN
GLUCOSE: 105 MG/DL (ref 74–106)
HCT VFR BLD AUTO: 40 % (ref 37–47)
HEMOGLOBIN: 14.7 G/DL (ref 12–15)
HGB BLD-MCNC: 14.7 G/DL (ref 12–15)
IMMATURE GRANULOCYTES COUNT: 0.02 X10^3/UL (ref 0–0)
MCV RBC: 84.6 FL (ref 81–99)
MEAN CORP HGB CONC: 36.8 G/DL (ref 32–36)
MEAN PLATELET VOL.: 10.1 FL (ref 6.2–12)
NRBC FLAGGED BY ANALYZER: 0 % (ref 0–5)
PLATELET # BLD: 278 K/MM3 (ref 150–450)
PLATELET COUNT: 278 K/MM3 (ref 150–450)
POTASSIUM: 3.7 MMOL/L (ref 3.5–5.1)
RBC # BLD AUTO: 4.73 M/MM3 (ref 4.2–5.4)
RBC DISTRIBUTION WIDTH CV: 12.1 % (ref 11.6–14.6)
RBC DISTRIBUTION WIDTH SD: 37 FL (ref 35.1–43.9)
TROPONIN-I HS: 5 PG/ML (ref 3–54)
WBC # BLD AUTO: 8.9 K/MM3 (ref 4.4–11)
WHITE BLOOD COUNT: 8.9 K/MM3 (ref 4.4–11)

## 2022-02-14 PROCEDURE — A4216 STERILE WATER/SALINE, 10 ML: HCPCS

## 2022-02-14 PROCEDURE — 80048 BASIC METABOLIC PNL TOTAL CA: CPT

## 2022-02-14 PROCEDURE — 85025 COMPLETE CBC W/AUTO DIFF WBC: CPT

## 2022-02-14 PROCEDURE — 84484 ASSAY OF TROPONIN QUANT: CPT

## 2022-02-14 PROCEDURE — 71275 CT ANGIOGRAPHY CHEST: CPT

## 2022-02-14 PROCEDURE — 96375 TX/PRO/DX INJ NEW DRUG ADDON: CPT

## 2022-02-14 PROCEDURE — 99285 EMERGENCY DEPT VISIT HI MDM: CPT

## 2022-02-14 PROCEDURE — 96374 THER/PROPH/DIAG INJ IV PUSH: CPT

## 2022-02-14 PROCEDURE — 96376 TX/PRO/DX INJ SAME DRUG ADON: CPT

## 2022-02-14 PROCEDURE — 93005 ELECTROCARDIOGRAM TRACING: CPT

## 2022-08-25 ENCOUNTER — HOSPITAL ENCOUNTER (EMERGENCY)
Age: 42
Discharge: HOME | End: 2022-08-25
Payer: MEDICAID

## 2022-08-25 VITALS
SYSTOLIC BLOOD PRESSURE: 116 MMHG | BODY MASS INDEX: 36 KG/M2 | DIASTOLIC BLOOD PRESSURE: 89 MMHG | OXYGEN SATURATION: 99 % | TEMPERATURE: 98.7 F | RESPIRATION RATE: 14 BRPM | HEART RATE: 92 BPM

## 2022-08-25 VITALS
HEART RATE: 74 BPM | TEMPERATURE: 97.88 F | SYSTOLIC BLOOD PRESSURE: 141 MMHG | DIASTOLIC BLOOD PRESSURE: 81 MMHG | RESPIRATION RATE: 16 BRPM

## 2022-08-25 DIAGNOSIS — F41.9: ICD-10-CM

## 2022-08-25 DIAGNOSIS — F32.A: ICD-10-CM

## 2022-08-25 DIAGNOSIS — F17.210: ICD-10-CM

## 2022-08-25 DIAGNOSIS — Z79.899: ICD-10-CM

## 2022-08-25 DIAGNOSIS — Z79.01: ICD-10-CM

## 2022-08-25 DIAGNOSIS — R07.89: Primary | ICD-10-CM

## 2022-08-25 DIAGNOSIS — Z86.711: ICD-10-CM

## 2022-08-25 LAB
ANION GAP: 6 (ref 5–15)
BUN SERPL-MCNC: 16 MG/DL (ref 7–18)
BUN/CREAT RATIO: 24.7 RATIO (ref 10–20)
CALCIUM SERPL-MCNC: 9.2 MG/DL (ref 8.5–10.1)
CARBON DIOXIDE: 29 MMOL/L (ref 21–32)
CHLORIDE: 105 MMOL/L (ref 98–107)
DEPRECATED RDW RBC: 36.9 FL (ref 35.1–43.9)
ERYTHROCYTE [DISTWIDTH] IN BLOOD: 11.6 % (ref 11.6–14.6)
EST GLOM FILT RATE - AFR AMER: 128 ML/MIN (ref 60–?)
ESTIMATED CREATININE CLEARANCE: 108.52 ML/MIN
GLUCOSE: 103 MG/DL (ref 74–106)
HCT VFR BLD AUTO: 41.3 % (ref 37–47)
HEMOGLOBIN: 14.3 G/DL (ref 12–15)
HGB BLD-MCNC: 14.3 G/DL (ref 12–15)
IMMATURE GRANULOCYTES COUNT: 0.01 X10^3/UL (ref 0–0)
MCV RBC: 86.6 FL (ref 81–99)
MEAN CORP HGB CONC: 34.6 G/DL (ref 32–36)
MEAN PLATELET VOL.: 9.4 FL (ref 6.2–12)
NRBC FLAGGED BY ANALYZER: 0 % (ref 0–5)
PLATELET # BLD: 319 K/MM3 (ref 150–450)
PLATELET COUNT: 319 K/MM3 (ref 150–450)
POTASSIUM: 4 MMOL/L (ref 3.5–5.1)
RBC # BLD AUTO: 4.77 M/MM3 (ref 4.2–5.4)
RBC DISTRIBUTION WIDTH CV: 11.6 % (ref 11.6–14.6)
RBC DISTRIBUTION WIDTH SD: 36.9 FL (ref 35.1–43.9)
TROPONIN-I HS: 4 PG/ML (ref 3–54)
WBC # BLD AUTO: 6.8 K/MM3 (ref 4.4–11)
WHITE BLOOD COUNT: 6.8 K/MM3 (ref 4.4–11)

## 2022-08-25 PROCEDURE — 85025 COMPLETE CBC W/AUTO DIFF WBC: CPT

## 2022-08-25 PROCEDURE — 93005 ELECTROCARDIOGRAM TRACING: CPT

## 2022-08-25 PROCEDURE — 80048 BASIC METABOLIC PNL TOTAL CA: CPT

## 2022-08-25 PROCEDURE — 71045 X-RAY EXAM CHEST 1 VIEW: CPT

## 2022-08-25 PROCEDURE — 84484 ASSAY OF TROPONIN QUANT: CPT

## 2022-08-25 PROCEDURE — 99285 EMERGENCY DEPT VISIT HI MDM: CPT

## 2022-08-25 PROCEDURE — A4216 STERILE WATER/SALINE, 10 ML: HCPCS

## 2022-11-29 ENCOUNTER — HOSPITAL ENCOUNTER (OUTPATIENT)
Dept: HOSPITAL 100 - LABSPEC | Age: 42
Discharge: HOME | End: 2022-11-29
Payer: MEDICAID

## 2022-11-29 DIAGNOSIS — Z11.3: Primary | ICD-10-CM

## 2022-11-29 PROCEDURE — 87491 CHLMYD TRACH DNA AMP PROBE: CPT

## 2022-11-29 PROCEDURE — 87591 N.GONORRHOEAE DNA AMP PROB: CPT

## 2023-04-25 ENCOUNTER — HOSPITAL ENCOUNTER (OUTPATIENT)
Dept: DATA CONVERSION | Facility: HOSPITAL | Age: 43
End: 2023-04-25
Attending: PLASTIC SURGERY | Admitting: PLASTIC SURGERY

## 2023-04-25 DIAGNOSIS — L91.0 HYPERTROPHIC SCAR: ICD-10-CM

## 2023-04-25 DIAGNOSIS — L90.5 SCAR CONDITIONS AND FIBROSIS OF SKIN: ICD-10-CM

## 2023-05-15 LAB
COMPLETE PATHOLOGY REPORT: NORMAL
CONVERTED CLINICAL DIAGNOSIS-HISTORY: NORMAL
CONVERTED FINAL DIAGNOSIS: NORMAL
CONVERTED FINAL REPORT PDF LINK TO COPY AND PASTE: NORMAL
CONVERTED GROSS DESCRIPTION: NORMAL

## 2023-09-07 VITALS — HEIGHT: 67 IN | WEIGHT: 249.34 LBS | BODY MASS INDEX: 39.13 KG/M2
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
## 2023-09-14 NOTE — H&P
History & Physical Reviewed:   Pregnant/Lactating:  ·  Are You Pregnant no   ·  Are You Currently Breastfeeding no     I have reviewed the History and Physical dated:  11-Apr-2023   History and Physical reviewed and relevant findings noted. Patient examined to review pertinent physical  findings.: No significant changes   Home Medications Reviewed: no changes noted   Allergies Reviewed: no changes noted       ERAS (Enhanced Recovery After Surgery):  ·  ERAS Patient: no     Consent:   COVID-19 Consent:  ·  COVID-19 Risk Consent Surgeon has reviewed key risks related to the risk of may COVID-19 and if they contract COVID-19 what the risks are.       Electronic Signatures:  Angela Clemente (APRN-CNP)  (Signed 25-Apr-2023 05:55)   Authored: History & Physical Reviewed, ERAS, Consent,  Note Completion      Last Updated: 25-Apr-2023 05:55 by Angela Clemente (APRN-CNP)

## 2023-10-02 NOTE — OP NOTE
Post Operative Note:     PreOp Diagnosis: Lower lip lesion   Post-Procedure Diagnosis: Lower lip lesion   Procedure: 1. Excision of lip mucosa  2. Complex closure   3.   4.   5.   Surgeon: Dr. Yunior Porras   Resident/Fellow/Other Assistant: Angela Clemente APRN/RNFA   Anesthesia: MAC   I.V. Fluids: 200 mL   Estimated Blood Loss (mL): 1 mL   Specimen: yes. Pathology   Findings: Lower lip intraoral lesion   Patient Returned To/Condition: PACU/Awake       Electronic Signatures:  Angela Clemente (APRN-CNP)   (Signed 25-Apr-2023 12:54)   Authored: Post Operative Note, Note Completion  Yunior Porras)   (Signed 26-Apr-2023 14:53)   Co-Signer: Post Operative Note, Note Completion    Last Updated: 26-Apr-2023 14:53 by Yunior Porras)
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
## 2024-03-13 ENCOUNTER — HOSPITAL ENCOUNTER (OUTPATIENT)
Dept: HOSPITAL 100 - OT | Age: 44
Discharge: HOME | End: 2024-03-13
Payer: MEDICAID

## 2024-03-13 DIAGNOSIS — R26.89: ICD-10-CM

## 2024-03-13 DIAGNOSIS — I89.0: ICD-10-CM

## 2024-03-13 DIAGNOSIS — R53.81: Primary | ICD-10-CM

## 2024-03-13 PROCEDURE — 97162 PT EVAL MOD COMPLEX 30 MIN: CPT

## 2024-03-13 PROCEDURE — 97166 OT EVAL MOD COMPLEX 45 MIN: CPT

## 2024-03-13 PROCEDURE — 97530 THERAPEUTIC ACTIVITIES: CPT

## 2024-03-13 NOTE — HP.OTEVAL
Patient's Visit Information
Visit Information
Visit Information: 
FARNZA MONTE is a 43 year old F, referred to Occupational Therapy by Dr. Ethan Alegre MD, with a diagnosis of lymphedema.

Date of Evaluation: 03/13/24
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

Subjective
Subjective: This 43 year old female was seen for OT eval with dx of lymphedema 
pt states she did have covid about 2 years ago and did develop PE x 2 (2021) pt states her dr. did take her off of her blood thinner about a year ago.  
Pt states she as issues with leg swelling and abdomen swelling.  deconditioned  
pt states she does pilates 3-4 x a week. pt states she does sometimes has increase swelling after her pilates   
pt states when her legs are really swollen she will take lasicx and it takes about a day to go down.  
pt states she has not worked since 2018 due to health issues.  
pt states she has noticed swelling in her legs and ankles since puberty.  
pt states her maternal grandmother ( hereditary)  
pt states she did were her compression socks when she had her PE's. 
pt states she could not find any compression socks that fit her.  
pt does not know what kind of compression class her socks were.
Lymphedema (Circumferential Measure)
Mid-foot: right 24cm left 24.5cm
Ankle: right 27cm left 28cm
Lower calf: right 30cm left 30cm
Largest calf: right 43cm left 40cm
Below knee: right 41cm left 41cm
Lower Limb Functional Index
Lower Extremity Functional Score: 59
Goals
Goal: Patient will demonstrate adequate knowledge of self-massage by the end of the second week.: Yes
Goal: Patient will demonstrate adequate knowledge of skin care and precautions by the end of the first week.: Yes
Goal: Patient will demonstrate adequate knowledge of therapeutic exercises by discharge.: Yes
Goal: Patient will select an appropriate compression garment and demonstrate adequate knowledge of correct donning technique, care and wearing schedule by discharge.: Yes
Goal: Patient will voice understanding of need to replace compression garment every four to six months by discharge.: Yes
Rehabilitation
General Assessment: pt demo with mild swelling in BLE at this time. pt demo need for skilled OT services 2- 3vists to ed. pt on life long mtg of lymphedema. Today therapist ed. pt on use of compression socks 20-30 mmHg and gave handout. therapist 
also ed. pt on use of lymph exercise 2-3x a day to stimulate circulation. Therapist ed. pt on to take measurements daily to assess what may trigger more swelling one day vs other days- pt demo understanding- therapist ed. pt when circulation is 
compromised she would benefit from water aerobics. pt demo understanding. pt to get compression socks and initiate ex. to return in two weeks- pt demo understanding and agree to POC. 
Rehabilitation Potential: Good
Anticipated Interventions
Anticipated Interventions: Education re Diagnosis, Education re Life-long lymphedema Management, Education re Skin Care and Precautions, Education re Self Massage Techniques, Education re Correct Donning Tech,Care&Wearing Sched Comp Garments and 
Home Program
Visit Plan
Frequency: 1 visit in 2-3 weeks
TEXT: 




Thank you for the opportunity to evaluate your patient.

For Medicare and Medicare HMO plans, please review the plan of care and approve it.
It will need to be FAXED BACK to us at [phone] for Medicare purposes.


Please let me know if there are questions or concerns regarding this plan of care.


Physician Signature:_________________________________________________Date:____________

## 2024-03-13 NOTE — HP.OTEVAL_ITS
Patient's Visit Information    
Visit Information    
Visit Information:     
FARNAZ MONTE is a 43 year old F, referred to Occupational Therapy by Dr. Ethan Alegre MD, with a diagnosis of lymphedema.    
    
Date of Evaluation: 03/13/24    
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    
    
Subjective    
Subjective: This 43 year old female was seen for OT eval with dx of lymphedema     
pt states she did have covid about 2 years ago and did develop PE x 2 (2021) pt   
states her dr. did take her off of her blood thinner about a year ago.      
Pt states she as issues with leg swelling and abdomen swelling.  deconditioned      
pt states she does pilates 3-4 x a week. pt states she does sometimes has   
increase swelling after her pilates       
pt states when her legs are really swollen she will take lasicx and it takes   
about a day to go down.      
pt states she has not worked since 2018 due to health issues.      
pt states she has noticed swelling in her legs and ankles since puberty.      
pt states her maternal grandmother ( hereditary)      
pt states she did were her compression socks when she had her PE's.     
pt states she could not find any compression socks that fit her.      
pt does not know what kind of compression class her socks were.    
Lymphedema (Circumferential Measure)    
Mid-foot: right 24cm left 24.5cm    
Ankle: right 27cm left 28cm    
Lower calf: right 30cm left 30cm    
Largest calf: right 43cm left 40cm    
Below knee: right 41cm left 41cm    
Lower Limb Functional Index    
Lower Extremity Functional Score: 59    
Goals    
Goal: Patient will demonstrate adequate knowledge of self-massage by the end of   
the second week.: Yes    
Goal: Patient will demonstrate adequate knowledge of skin care and precautions   
by the end of the first week.: Yes    
Goal: Patient will demonstrate adequate knowledge of therapeutic exercises by   
discharge.: Yes    
Goal: Patient will select an appropriate compression garment and demonstrate   
adequate knowledge of correct donning technique, care and wearing schedule by   
discharge.: Yes    
Goal: Patient will voice understanding of need to replace compression garment   
every four to six months by discharge.: Yes    
Rehabilitation    
General Assessment: pt demo with mild swelling in BLE at this time. pt demo need  
for skilled OT services 2- 3vists to ed. pt on life long mtg of lymphedema.   
Today therapist ed. pt on use of compression socks 20-30 mmHg and gave handout.   
therapist also ed. pt on use of lymph exercise 2-3x a day to stimulate   
circulation. Therapist ed. pt on to take measurements daily to assess what may   
trigger more swelling one day vs other days- pt demo understanding- therapist   
ed. pt when circulation is compromised she would benefit from water aerobics. pt  
demo understanding. pt to get compression socks and initiate ex. to return in   
two weeks- pt demo understanding and agree to POC.     
Rehabilitation Potential: Good    
Anticipated Interventions    
Anticipated Interventions: Education re Diagnosis, Education re Life-long   
lymphedema Management, Education re Skin Care and Precautions, Education re Self  
Massage Techniques, Education re Correct Donning Tech,Care&Wearing Sched Comp   
Garments and Home Program    
Visit Plan    
Frequency: 1 visit in 2-3 weeks    
TEXT:     
    
    
    
    
Thank you for the opportunity to evaluate your patient.    
    
For Medicare and Medicare HMO plans, please review the plan of care and approve   
it.    
It will need to be FAXED BACK to us at [phone] for Medicare purposes.    
    
    
Please let me know if there are questions or concerns regarding this plan of   
care.    
    
    
Physician Signature:______________  
___________________________________Date:____________

## 2024-05-29 NOTE — HP.PT.NRP
Patient Information
Patient Information: 
FARNAZ MONTE was seen in my office for initial evaluation on 03/11/24. 

The following Plan of Care was established for this patient:

POC Established
Initial Duration: 4-6 Weeks
Anticipated Interventions
Patient/Client Instruction:  Educate patient on: Condition, Plan of Care and Risk Factors
For the Purpose of:: To improve self management
Therapeutic Exercise to Include: Strength training, Endurance training, Body mechanics, Postural training, Flexibilty training, Neuromotor development,  In an aquatic setting  and Dynamic Lumbar Stabilization
For the Purpose of:: To decrease pain, To improve muscle performance and motor function, To increase tolerance to activity/condition/position, To improve ability of physical actions for home/community/work/leisure, To improve gait and locomotor 
functions, To increase flexibility/ROM and To improve endurance
Last Seen
Last Seen: 
This patient was last seen in our office 03/11/24.  Pertinent comments regarding their Physical therapy will appear below:

This patient has not returned for follow up since her initial Physical Therapy evaluation. She is appropriate to return to her doctor for further follow up as needed. 


At this point I will be discontinuing this patient from physical therapy.  I would be happy to see this patient again in the future if found appropriate by the physician.

Thank you!

Letty Worrell, PT, Cert MDT


Balance/Gait/Functional tests
Balance/Special Test Scores
Lower Extremity Functional Score: 19

## 2024-05-29 NOTE — HP.PTDCNRP_ITS
Patient Information    
Patient Information:     
FARNAZ MONTE was seen in my office for initial evaluation on 03/11/24.     
    
The following Plan of Care was established for this patient:    
    
POC Established    
Initial Duration: 4-6 Weeks    
Anticipated Interventions    
Patient/Client Instruction:  Educate patient on: Condition, Plan of Care and   
Risk Factors    
For the Purpose of:: To improve self management    
Therapeutic Exercise to Include: Strength training, Endurance training, Body   
mechanics, Postural training, Flexibilty training, Neuromotor development,  In   
an aquatic setting  and Dynamic Lumbar Stabilization    
For the Purpose of:: To decrease pain, To improve muscle performance and motor   
function, To increase tolerance to activity/condition/position, To improve abil  
ity of physical actions for home/community/work/leisure, To improve gait and   
locomotor functions, To increase flexibility/ROM and To improve endurance    
Last Seen    
Last Seen:     
This patient was last seen in our office 03/11/24.  Pertinent comments regarding  
their Physical therapy will appear below:    
    
This patient has not returned for follow up since her initial Physical Therapy   
evaluation. She is appropriate to return to her doctor for further follow up as   
needed.     
    
    
At this point I will be discontinuing this patient from physical therapy.  I   
would be happy to see this patient again in the future if found appropriate by   
the physician.    
    
Thank you!    
    
Letty Worrell, PT, Cert MDT    
    
    
Balance/Gait/Functional tests    
Balance/Special Test Scores    
Lower Extremity Functional Score: 19

## 2025-07-31 ENCOUNTER — HOSPITAL ENCOUNTER (EMERGENCY)
Age: 45
Discharge: HOME | End: 2025-07-31
Payer: MEDICAID

## 2025-07-31 VITALS
HEART RATE: 81 BPM | RESPIRATION RATE: 14 BRPM | SYSTOLIC BLOOD PRESSURE: 131 MMHG | TEMPERATURE: 97 F | DIASTOLIC BLOOD PRESSURE: 97 MMHG | OXYGEN SATURATION: 96 %

## 2025-07-31 VITALS
BODY MASS INDEX: 39.2 KG/M2 | OXYGEN SATURATION: 99 % | RESPIRATION RATE: 14 BRPM | SYSTOLIC BLOOD PRESSURE: 153 MMHG | TEMPERATURE: 97 F | HEART RATE: 99 BPM | DIASTOLIC BLOOD PRESSURE: 100 MMHG

## 2025-07-31 VITALS — SYSTOLIC BLOOD PRESSURE: 131 MMHG | OXYGEN SATURATION: 96 % | DIASTOLIC BLOOD PRESSURE: 97 MMHG | HEART RATE: 81 BPM

## 2025-07-31 DIAGNOSIS — R06.00: ICD-10-CM

## 2025-07-31 DIAGNOSIS — R60.0: Primary | ICD-10-CM

## 2025-07-31 DIAGNOSIS — R07.9: ICD-10-CM

## 2025-07-31 DIAGNOSIS — F17.210: ICD-10-CM

## 2025-07-31 LAB
ABSOLUTE NEUTROPHIL COUNT: 2.4 X10^3/UL (ref 2–7.7)
ANION GAP SERPL CALC-SCNC: 8 MMOL/L (ref 5–15)
ANISOCYTOSIS BLD QL SMEAR: (no result)
ATYPICAL LYMPHOCYTE: (no result) %
BASO STIPL BLD QL SMEAR: (no result)
BASOPHIL#: 0.03 X10^3/UL
BASOPHIL%: 0.5 % (ref 0–1)
BASOPHILS NFR SPEC MANUAL: (no result) % (ref 0–1)
BITE CELLS BLD QL SMEAR: (no result)
BUN SERPL-MCNC: 16 MG/DL (ref 4–19)
BUN/CREAT SERPL: 26.3 RATIO (ref 10–20)
BURR CELLS BLD QL SMEAR: (no result)
CALCIUM SERPL-MCNC: 9.1 MG/DL (ref 7.6–11)
CHLORIDE: 102 MMOL/L (ref 98–108)
CO2 BLDCV-SCNC: 26 MMOL/L (ref 21–32)
CREAT SERPL-MCNC: 0.59 MG/DL (ref 0.7–1.2)
DEPRECATED RDW RBC: 38.7 FL (ref 35.1–43.9)
DIFFERENTIAL INDICATED: (no result)
DOHLE BOD BLD QL SMEAR: (no result)
EOSINOPHIL # BLD: 0.02 X10^3/UL
ERYTHROCYTE [DISTWIDTH] IN BLOOD: 11.9 % (ref 11.6–14.6)
ESTIMATED CREATININE CLEARANCE: 155.04 ML/MIN (ref 50–250)
GLUCOSE SERPL-MCNC: 102 MG/DL (ref 70–99)
HCT VFR BLD AUTO: 38.9 % (ref 37–47)
HGB BLD-MCNC: 13.5 G/DL (ref 12–15)
HOWELL-JOLLY BOD BLD QL SMEAR: (no result)
HYPOCHROMIA BLD QL: (no result)
IMM GRANULOCYTES NFR BLD AUTO: 0.2 % (ref 0–0.9)
IMMATURE GRANULOCYTES COUNT: 0.01 X10^3/UL (ref 0–0)
LYMPHOCYTES # SPEC AUTO: 2.29 X10^3/UL (ref 0.83–4.51)
LYMPHOCYTES # SPEC AUTO: 2.29 X10^3/UL (ref 0.83–4.51)
LYMPHOCYTES NFR SPEC AUTO: 41.7 % (ref 19–41)
Lab: 0.4 % (ref 0–5)
MACROCYTES BLD QL: (no result)
MANUAL DIF COMMENT BLD-IMP: (no result)
MCH RBC QN AUTO: 31 PG (ref 27–32)
MCV RBC: 89.4 FL (ref 81–99)
MEAN CORP HGB CONC: 34.7 G/DL (ref 32–36)
MEAN PLATELET VOL.: 9.3 FL (ref 6.2–12)
MONOCYTE#: 0.71 X10^3/UL
MONOCYTE%: 12.9 % (ref 0–10)
NEUTROPHIL #: 2.43 X10^3/UL (ref 2.7–7.7)
NEUTROPHILS NFR BLD AUTO: 44.3 % (ref 47–70)
NEUTS HYPERSEG # BLD: (no result) 10*3/UL
NRBC FLAGGED BY ANALYZER: 0 % (ref 0–5)
PLATELET # BLD: 289 K/MM3 (ref 150–450)
PLATELET ESTIMATE: (no result)
PLATELET MORPHOLOGY: (no result)
POLYCHROMASIA BLD QL SMEAR: (no result)
POSITIVE MORPHOLOGY: YES
POTASSIUM SPEC-MCNC: 4.1 MMOL/L (ref 3.3–5.1)
PRO- BRAIN NATRIURETIC PEPTIDE: 68 PG/ML (ref ?–450)
RBC # BLD AUTO: 4.35 M/MM3 (ref 4.2–5.4)
REACTIVE LYMPHOCYTE: (no result)
SCAN SMEAR PER REVIEW CRITERIA: (no result)
SODIUM LEVEL: 136 MMOL/L (ref 133–145)
TROPONIN T HIGH SENSITIVITY: < 6 NG/L (ref ?–14)
TROPONIN T SERPL HS-MCNC: < 6 NG/L (ref ?–14)
WBC # BLD AUTO: 5.5 K/MM3 (ref 4.4–11)
WBC NRBC COR # BLD: (no result) K/MM3 (ref 4.4–11)

## 2025-07-31 PROCEDURE — 93005 ELECTROCARDIOGRAM TRACING: CPT

## 2025-07-31 PROCEDURE — 71275 CT ANGIOGRAPHY CHEST: CPT

## 2025-07-31 PROCEDURE — 80048 BASIC METABOLIC PNL TOTAL CA: CPT

## 2025-07-31 PROCEDURE — 99283 EMERGENCY DEPT VISIT LOW MDM: CPT

## 2025-07-31 PROCEDURE — 85025 COMPLETE CBC W/AUTO DIFF WBC: CPT

## 2025-07-31 PROCEDURE — A4216 STERILE WATER/SALINE, 10 ML: HCPCS

## 2025-07-31 PROCEDURE — 84484 ASSAY OF TROPONIN QUANT: CPT

## 2025-07-31 PROCEDURE — 83880 ASSAY OF NATRIURETIC PEPTIDE: CPT

## 2025-07-31 PROCEDURE — 93970 EXTREMITY STUDY: CPT
